# Patient Record
Sex: MALE | Race: BLACK OR AFRICAN AMERICAN
[De-identification: names, ages, dates, MRNs, and addresses within clinical notes are randomized per-mention and may not be internally consistent; named-entity substitution may affect disease eponyms.]

---

## 2020-07-29 ENCOUNTER — HOSPITAL ENCOUNTER (EMERGENCY)
Dept: HOSPITAL 57 - BURERS | Age: 35
Discharge: HOME | End: 2020-07-29
Payer: SELF-PAY

## 2020-07-29 DIAGNOSIS — S39.012A: Primary | ICD-10-CM

## 2020-07-29 DIAGNOSIS — F17.210: ICD-10-CM

## 2020-07-29 DIAGNOSIS — S29.012A: ICD-10-CM
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
## 2020-07-29 LAB
ALBUMIN SERPL BCG-MCNC: 4.1 G/DL (ref 3.5–5)
ALP SERPL-CCNC: 71 U/L (ref 40–110)
ALT SERPL W P-5'-P-CCNC: 23 U/L (ref 8–55)
ANION GAP SERPL CALC-SCNC: 15 MMOL/L (ref 10–20)
AST SERPL-CCNC: 38 U/L (ref 5–34)
BASOPHILS # BLD AUTO: 0.1 THOU/UL (ref 0–0.2)
BASOPHILS NFR BLD AUTO: 1.7 % (ref 0–1)
BILIRUB SERPL-MCNC: 0.3 MG/DL (ref 0.2–1.2)
BUN SERPL-MCNC: 10 MG/DL (ref 8.9–20.6)
CALCIUM SERPL-MCNC: 8.7 MG/DL (ref 7.8–10.44)
CHLORIDE SERPL-SCNC: 106 MMOL/L (ref 98–107)
CO2 SERPL-SCNC: 22 MMOL/L (ref 22–29)
CREAT CL PREDICTED SERPL C-G-VRATE: 0 ML/MIN (ref 70–130)
EOSINOPHIL # BLD AUTO: 0.4 THOU/UL (ref 0–0.7)
EOSINOPHIL NFR BLD AUTO: 5.2 % (ref 0–10)
GLOBULIN SER CALC-MCNC: 2.6 G/DL (ref 2.4–3.5)
GLUCOSE SERPL-MCNC: 103 MG/DL (ref 70–105)
HGB BLD-MCNC: 13.7 G/DL (ref 14–18)
INR PPP: 1
LYMPHOCYTES # BLD AUTO: 2.4 THOU/UL (ref 1.2–3.4)
LYMPHOCYTES NFR BLD AUTO: 31.7 % (ref 21–51)
MCH RBC QN AUTO: 31.7 PG (ref 27–31)
MCV RBC AUTO: 99.3 FL (ref 78–98)
MONOCYTES # BLD AUTO: 0.6 THOU/UL (ref 0.11–0.59)
MONOCYTES NFR BLD AUTO: 7.1 % (ref 0–10)
NEUTROPHILS # BLD AUTO: 4.2 THOU/UL (ref 1.4–6.5)
NEUTROPHILS NFR BLD AUTO: 54.2 % (ref 42–75)
PLATELET # BLD AUTO: 145 THOU/UL (ref 130–400)
POTASSIUM SERPL-SCNC: 3.8 MMOL/L (ref 3.5–5.1)
PROTHROMBIN TIME: 13.2 SEC (ref 12–14.7)
RBC # BLD AUTO: 4.31 MILL/UL (ref 4.7–6.1)
SODIUM SERPL-SCNC: 139 MMOL/L (ref 136–145)
WBC # BLD AUTO: 7.7 THOU/UL (ref 4.8–10.8)

## 2020-07-29 PROCEDURE — 85025 COMPLETE CBC W/AUTO DIFF WBC: CPT

## 2020-07-29 PROCEDURE — 74177 CT ABD & PELVIS W/CONTRAST: CPT

## 2020-07-29 PROCEDURE — 71260 CT THORAX DX C+: CPT

## 2020-07-29 PROCEDURE — 85610 PROTHROMBIN TIME: CPT

## 2020-07-29 PROCEDURE — 80053 COMPREHEN METABOLIC PANEL: CPT

## 2020-07-29 PROCEDURE — 83605 ASSAY OF LACTIC ACID: CPT

## 2020-07-29 PROCEDURE — 96374 THER/PROPH/DIAG INJ IV PUSH: CPT

## 2020-07-29 PROCEDURE — 36415 COLL VENOUS BLD VENIPUNCTURE: CPT
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
